# Patient Record
Sex: MALE | Race: BLACK OR AFRICAN AMERICAN | Employment: FULL TIME | URBAN - METROPOLITAN AREA
[De-identification: names, ages, dates, MRNs, and addresses within clinical notes are randomized per-mention and may not be internally consistent; named-entity substitution may affect disease eponyms.]

---

## 2023-02-04 ENCOUNTER — HOSPITAL ENCOUNTER (EMERGENCY)
Age: 28
Discharge: HOME | End: 2023-02-04
Attending: EMERGENCY MEDICINE

## 2023-02-04 VITALS
DIASTOLIC BLOOD PRESSURE: 84 MMHG | HEART RATE: 72 BPM | HEIGHT: 72 IN | TEMPERATURE: 98.2 F | WEIGHT: 184.08 LBS | BODY MASS INDEX: 24.93 KG/M2 | RESPIRATION RATE: 16 BRPM | OXYGEN SATURATION: 100 % | SYSTOLIC BLOOD PRESSURE: 158 MMHG

## 2023-02-04 DIAGNOSIS — N34.2 URETHRITIS: Primary | ICD-10-CM

## 2023-02-04 LAB
AMORPHOUS: NORMAL /HPF
BILIRUBIN URINE: NEGATIVE
BLOOD, URINE: NEGATIVE
CLARITY: CLEAR
COLOR: YELLOW
EPITHELIAL CELLS, UA: NORMAL /HPF (ref 0–5)
GLUCOSE URINE: NEGATIVE MG/DL
KETONES, URINE: NEGATIVE MG/DL
LEUKOCYTE ESTERASE, URINE: ABNORMAL
MICROSCOPIC EXAMINATION: YES
NITRITE, URINE: NEGATIVE
PH UA: 7 (ref 5–8)
PROTEIN UA: NEGATIVE MG/DL
RBC UA: NORMAL /HPF (ref 0–4)
SPECIFIC GRAVITY UA: 1.02 (ref 1–1.03)
TRICHOMONAS: NORMAL /HPF
URINE TRICHOMONAS EVALUATION: NORMAL
URINE TYPE: ABNORMAL
UROBILINOGEN, URINE: 0.2 E.U./DL
WBC UA: NORMAL /HPF (ref 0–5)

## 2023-02-04 PROCEDURE — 96372 THER/PROPH/DIAG INJ SC/IM: CPT

## 2023-02-04 PROCEDURE — 6360000002 HC RX W HCPCS: Performed by: EMERGENCY MEDICINE

## 2023-02-04 PROCEDURE — 87491 CHLMYD TRACH DNA AMP PROBE: CPT

## 2023-02-04 PROCEDURE — 99284 EMERGENCY DEPT VISIT MOD MDM: CPT

## 2023-02-04 PROCEDURE — 6370000000 HC RX 637 (ALT 250 FOR IP): Performed by: EMERGENCY MEDICINE

## 2023-02-04 PROCEDURE — 87086 URINE CULTURE/COLONY COUNT: CPT

## 2023-02-04 PROCEDURE — 81001 URINALYSIS AUTO W/SCOPE: CPT

## 2023-02-04 PROCEDURE — 87591 N.GONORRHOEAE DNA AMP PROB: CPT

## 2023-02-04 RX ORDER — CEFTRIAXONE 500 MG/1
500 INJECTION, POWDER, FOR SOLUTION INTRAMUSCULAR; INTRAVENOUS ONCE
Status: COMPLETED | OUTPATIENT
Start: 2023-02-04 | End: 2023-02-04

## 2023-02-04 RX ORDER — DOXYCYCLINE HYCLATE 100 MG
100 TABLET ORAL ONCE
Status: COMPLETED | OUTPATIENT
Start: 2023-02-04 | End: 2023-02-04

## 2023-02-04 RX ORDER — DOXYCYCLINE HYCLATE 100 MG
100 TABLET ORAL 2 TIMES DAILY
Qty: 14 TABLET | Refills: 0 | Status: SHIPPED | OUTPATIENT
Start: 2023-02-04 | End: 2023-02-11

## 2023-02-04 RX ADMIN — DOXYCYCLINE HYCLATE 100 MG: 100 TABLET, COATED ORAL at 11:09

## 2023-02-04 RX ADMIN — CEFTRIAXONE SODIUM 500 MG: 500 INJECTION, POWDER, FOR SOLUTION INTRAMUSCULAR; INTRAVENOUS at 11:09

## 2023-02-04 ASSESSMENT — PAIN - FUNCTIONAL ASSESSMENT: PAIN_FUNCTIONAL_ASSESSMENT: NONE - DENIES PAIN

## 2023-02-04 NOTE — DISCHARGE INSTRUCTIONS
Your prescription has been sent to [facility]. Try to drink a full glass of water after taking your antibiotic. You should take your next dose of medication with dinner tonight. A representative from the hospital should be contacting you to arrange for a follow up appointment in clinic. If you have any new or worsening issues after going home don't hesitate to return here for reevaluation at any time 24/7!

## 2023-02-04 NOTE — ED NOTES
AVS reviewed with patient. Verbalized understanding. AVS was printed and given to patient. Prescriptions sent electronically to patients pharmacy of choice.      Brooks Halsted (Tracee) Julia Malhotra RN  02/04/23 [id number]

## 2023-02-05 LAB — URINE CULTURE, ROUTINE: NORMAL

## 2023-02-06 LAB
C. TRACHOMATIS DNA ,URINE: NEGATIVE
N. GONORRHOEAE DNA, URINE: NEGATIVE

## 2023-02-14 ASSESSMENT — ENCOUNTER SYMPTOMS
ABDOMINAL PAIN: 0
NAUSEA: 0
SHORTNESS OF BREATH: 0
BACK PAIN: 0

## 2023-02-14 NOTE — ED PROVIDER NOTES
[id number] [facility]    Name: Edna Alberto : 1995 MRN: [id number] Date of Service: 2023    BP (!) 158/84   Pulse 72   Temp 98.2 °F (36.8 °C) (Oral)   Resp 16   Ht 6' (1.829 m)   Wt 184 lb 1.4 oz (83.5 kg)   SpO2 100%   BMI 24.97 kg/m²     CC: penile discharge    HPI: this patient is a 32 y.o. male presenting to the ED from home. Mr. Emilee Sheikh tells me that over the past 24 hours he has noticed intermittent, clear-white penile discharge. He has also has some slight urethral discomfort when urinating as of late. He recently has intercourse with a female partner without the use of a barrier contraceptive. He was concerned that he may have acquired a sexually transmitted infection, prompting him to come here for evaluation this morning. He has not appreciated other changes from his usual state of health and he denies other current complaints.  _____________________________________________________________________    Past Medical History:   Diagnosis Date    Patient denies chronic medical problems        History reviewed. No pertinent surgical history. Social History     Tobacco Use    Smoking status: Never   Substance Use Topics    Alcohol use: Yes    Drug use: Not Currently       No family history on file.  _____________________________________________________________________    Review of Systems   Constitutional:  Negative for chills and fever. HENT:  Negative for mouth sores. Respiratory:  Negative for shortness of breath. Cardiovascular:  Negative for chest pain. Gastrointestinal:  Negative for abdominal pain and nausea. Genitourinary:  Positive for dysuria and penile discharge. Negative for decreased urine volume, difficulty urinating, genital sores, penile pain, penile swelling, scrotal swelling and testicular pain. Musculoskeletal:  Negative for arthralgias, back pain and joint swelling. Skin:  Negative for rash.    Neurological:  Negative for weakness and headaches. Physical Exam  Constitutional:       General: He is awake. He is not in acute distress. Appearance: He is not ill-appearing, toxic-appearing or diaphoretic. HENT:      Head: Normocephalic and atraumatic. Eyes:      Conjunctiva/sclera: Conjunctivae normal.   Neck:      Vascular: No JVD. Cardiovascular:      Rate and Rhythm: Normal rate and regular rhythm. Heart sounds: Normal heart sounds. No murmur heard. Pulmonary:      Effort: Pulmonary effort is normal. No respiratory distress. Breath sounds: Normal breath sounds. Abdominal:      General: There is no distension. Palpations: Abdomen is soft. Tenderness: There is no abdominal tenderness. There is no guarding or rebound. Genitourinary:     Penis: Discharge present. No erythema, swelling or lesions. Testes:         Right: Mass or swelling not present. Left: Mass or swelling not present. Lymphadenopathy:      Lower Body: No right inguinal adenopathy. No left inguinal adenopathy. Skin:     General: Skin is warm and dry. Coloration: Skin is not cyanotic, mottled or pale. Neurological:      Mental Status: He is alert and oriented to person, place, and time. Psychiatric:         Speech: Speech normal.         Behavior: Behavior normal.     _____________________________________________________________________    MEDICAL DECISION MAKING & PLANS:    I reviewed the patient's recent and/or pertinent records, current medications, triage notes, allergies, and vital signs.     Differential Diagnosis:  UTI, STI,  infection, urethritis    Labs & Studies:  Urinalysis  Urine culture  Chlamydia & gonorrhea PCR    Treatments:  Medications   cefTRIAXone (ROCEPHIN) injection 500 mg (500 mg IntraMUSCular Given 2/4/23 1109)   doxycycline hyclate (VIBRA-TABS) tablet 100 mg (100 mg Oral Given 2/4/23 1109)     Discharge Medication List as of 2/4/2023 11:42 AM        START taking these medications    Details doxycycline hyclate (VIBRA-TABS) 100 MG tablet Take 1 tablet by mouth 2 times daily for 7 days, Disp-14 tablet, R-0Normal           Disposition:  Mr. Kimberly Gonzalez does appear to have some penile discharge but he is otherwise very well-appearing on my evaluation in the ED this morning. Recommended urine and STI testing and he is in agreement with this plan. Discussed the R/B of empiric STI treatment versus waiting for lab results, which are not expected to result during his time in the ED today, and he prefers to proceed with empiric treatment. Discussed exam findings, likely Dx, and expected clinical course with him at length. He is comfortable with DC home now. Return precautions reviewed in detail and outpatient follow up advised. Clinical Impression(s)  1. Urethritis        Medical Decision Making  Problems Addressed:  Urethritis: acute illness or injury    Amount and/or Complexity of Data Reviewed  Labs: ordered. Risk  Prescription drug management. Provider Signature: MD Meghan Thomas MD  02/14/23 1409